# Patient Record
Sex: FEMALE | Race: WHITE | Employment: OTHER | URBAN - METROPOLITAN AREA
[De-identification: names, ages, dates, MRNs, and addresses within clinical notes are randomized per-mention and may not be internally consistent; named-entity substitution may affect disease eponyms.]

---

## 2017-02-06 ENCOUNTER — APPOINTMENT (OUTPATIENT)
Dept: CT IMAGING | Age: 82
End: 2017-02-06
Attending: EMERGENCY MEDICINE
Payer: MEDICARE

## 2017-02-06 ENCOUNTER — HOSPITAL ENCOUNTER (EMERGENCY)
Age: 82
Discharge: HOME | End: 2017-02-06
Attending: EMERGENCY MEDICINE
Payer: MEDICARE

## 2017-02-06 ENCOUNTER — HOME CARE VISIT (OUTPATIENT)
Dept: SCHEDULING | Facility: HOME HEALTH | Age: 82
End: 2017-02-06
Payer: MEDICARE

## 2017-02-06 ENCOUNTER — HOSPICE ADMISSION (OUTPATIENT)
Dept: HOSPICE | Facility: HOSPICE | Age: 82
End: 2017-02-06
Payer: MEDICARE

## 2017-02-06 VITALS
RESPIRATION RATE: 16 BRPM | SYSTOLIC BLOOD PRESSURE: 146 MMHG | TEMPERATURE: 98.2 F | WEIGHT: 116 LBS | OXYGEN SATURATION: 98 % | DIASTOLIC BLOOD PRESSURE: 62 MMHG | BODY MASS INDEX: 21.35 KG/M2 | HEIGHT: 62 IN | HEART RATE: 76 BPM

## 2017-02-06 DIAGNOSIS — I61.5 IVH (INTRAVENTRICULAR HEMORRHAGE) (HCC): Primary | ICD-10-CM

## 2017-02-06 LAB
ALBUMIN SERPL BCP-MCNC: 3.5 G/DL (ref 3.2–4.6)
ALBUMIN/GLOB SERPL: 1.1 {RATIO} (ref 1.2–3.5)
ALP SERPL-CCNC: 104 U/L (ref 50–136)
ALT SERPL-CCNC: 11 U/L (ref 12–65)
ANION GAP BLD CALC-SCNC: 10 MMOL/L (ref 7–16)
AST SERPL W P-5'-P-CCNC: 17 U/L (ref 15–37)
BASOPHILS # BLD AUTO: 0.1 K/UL (ref 0–0.2)
BASOPHILS # BLD: 0 % (ref 0–2)
BILIRUB SERPL-MCNC: 0.5 MG/DL (ref 0.2–1.1)
BUN SERPL-MCNC: 40 MG/DL (ref 8–23)
CALCIUM SERPL-MCNC: 9.4 MG/DL (ref 8.3–10.4)
CHLORIDE SERPL-SCNC: 109 MMOL/L (ref 98–107)
CO2 SERPL-SCNC: 24 MMOL/L (ref 21–32)
CREAT SERPL-MCNC: 1.29 MG/DL (ref 0.6–1)
DIFFERENTIAL METHOD BLD: ABNORMAL
EOSINOPHIL # BLD: 0.1 K/UL (ref 0–0.8)
EOSINOPHIL NFR BLD: 1 % (ref 0.5–7.8)
ERYTHROCYTE [DISTWIDTH] IN BLOOD BY AUTOMATED COUNT: 13.3 % (ref 11.9–14.6)
GLOBULIN SER CALC-MCNC: 3.2 G/DL (ref 2.3–3.5)
GLUCOSE SERPL-MCNC: 127 MG/DL (ref 65–100)
HCT VFR BLD AUTO: 34.9 % (ref 35.8–46.3)
HGB BLD-MCNC: 11.4 G/DL (ref 11.7–15.4)
IMM GRANULOCYTES # BLD: 0 K/UL (ref 0–0.5)
IMM GRANULOCYTES NFR BLD AUTO: 0.3 % (ref 0–5)
INR PPP: 1 (ref 0.9–1.2)
LYMPHOCYTES # BLD AUTO: 22 % (ref 13–44)
LYMPHOCYTES # BLD: 2.5 K/UL (ref 0.5–4.6)
MCH RBC QN AUTO: 30.4 PG (ref 26.1–32.9)
MCHC RBC AUTO-ENTMCNC: 32.7 G/DL (ref 31.4–35)
MCV RBC AUTO: 93.1 FL (ref 79.6–97.8)
MONOCYTES # BLD: 0.6 K/UL (ref 0.1–1.3)
MONOCYTES NFR BLD AUTO: 5 % (ref 4–12)
NEUTS SEG # BLD: 8.3 K/UL (ref 1.7–8.2)
NEUTS SEG NFR BLD AUTO: 72 % (ref 43–78)
PLATELET # BLD AUTO: 218 K/UL (ref 150–450)
PMV BLD AUTO: 11 FL (ref 10.8–14.1)
POTASSIUM SERPL-SCNC: 4.4 MMOL/L (ref 3.5–5.1)
PROT SERPL-MCNC: 6.7 G/DL (ref 6.3–8.2)
PROTHROMBIN TIME: 10.8 SEC (ref 9.6–12)
RBC # BLD AUTO: 3.75 M/UL (ref 4.05–5.25)
SODIUM SERPL-SCNC: 143 MMOL/L (ref 136–145)
WBC # BLD AUTO: 11.5 K/UL (ref 4.3–11.1)

## 2017-02-06 PROCEDURE — HOSPICE MEDICATION HC HH HOSPICE MEDICATION

## 2017-02-06 PROCEDURE — 99285 EMERGENCY DEPT VISIT HI MDM: CPT | Performed by: EMERGENCY MEDICINE

## 2017-02-06 PROCEDURE — 70450 CT HEAD/BRAIN W/O DYE: CPT

## 2017-02-06 PROCEDURE — 72125 CT NECK SPINE W/O DYE: CPT

## 2017-02-06 PROCEDURE — 74011250636 HC RX REV CODE- 250/636: Performed by: EMERGENCY MEDICINE

## 2017-02-06 PROCEDURE — 85610 PROTHROMBIN TIME: CPT | Performed by: EMERGENCY MEDICINE

## 2017-02-06 PROCEDURE — G0299 HHS/HOSPICE OF RN EA 15 MIN: HCPCS

## 2017-02-06 PROCEDURE — 80053 COMPREHEN METABOLIC PANEL: CPT | Performed by: EMERGENCY MEDICINE

## 2017-02-06 PROCEDURE — 96374 THER/PROPH/DIAG INJ IV PUSH: CPT | Performed by: EMERGENCY MEDICINE

## 2017-02-06 PROCEDURE — 85025 COMPLETE CBC W/AUTO DIFF WBC: CPT | Performed by: EMERGENCY MEDICINE

## 2017-02-06 PROCEDURE — 0651 HSPC ROUTINE HOME CARE

## 2017-02-06 PROCEDURE — 3336500001 HSPC ELECTION

## 2017-02-06 PROCEDURE — 96375 TX/PRO/DX INJ NEW DRUG ADDON: CPT | Performed by: EMERGENCY MEDICINE

## 2017-02-06 RX ORDER — ONDANSETRON 2 MG/ML
4 INJECTION INTRAMUSCULAR; INTRAVENOUS
Status: COMPLETED | OUTPATIENT
Start: 2017-02-06 | End: 2017-02-06

## 2017-02-06 RX ORDER — HYDROMORPHONE HYDROCHLORIDE 1 MG/ML
0.2 INJECTION, SOLUTION INTRAMUSCULAR; INTRAVENOUS; SUBCUTANEOUS
Status: COMPLETED | OUTPATIENT
Start: 2017-02-06 | End: 2017-02-06

## 2017-02-06 RX ORDER — MORPHINE SULFATE 20 MG/5ML
5 SOLUTION ORAL
Qty: 30 ML | Refills: 0 | Status: SHIPPED | OUTPATIENT
Start: 2017-02-06 | End: 2017-02-08

## 2017-02-06 RX ADMIN — ONDANSETRON 4 MG: 2 INJECTION INTRAMUSCULAR; INTRAVENOUS at 08:46

## 2017-02-06 RX ADMIN — HYDROMORPHONE HYDROCHLORIDE 0.2 MG: 1 INJECTION, SOLUTION INTRAMUSCULAR; INTRAVENOUS; SUBCUTANEOUS at 08:46

## 2017-02-06 NOTE — ED NOTES
I have reviewed discharge instructions with the caregiver. The caregiver verbalized understanding.  Pt transported back to [facility] via [address] [place] [address]

## 2017-02-06 NOTE — PROGRESS NOTES
NS   CT SHOWS A SMALL LEFT IVH  NOT TRAUMA;  CVA  89 YEARS OL  NO HYDROCEPHALUS  A/P NON SURGICAL  MED  Azeb Ortega MD

## 2017-02-06 NOTE — ED NOTES
One side rail up, bed in low position,bed brakes on, call light within reach. Pt resting in bed,resp easy,VSS,family at bedside, belongs in reach. Offered restroom and change in position.

## 2017-02-06 NOTE — DISCHARGE INSTRUCTIONS
Learning About a Hemorrhagic Stroke  What is a hemorrhagic stroke? When you have a hemorrhagic (say \"akz-ely-VG-rocío\") stroke, it means that a blood vessel in the brain has burst open or has started to leak. When the blood spills into the space inside and around the brain, it damages nearby nerve cells. This is different from an ischemic (say \"ddp-MIH-vqyi\") stroke, which happens when a blood clot blocks a blood vessel in the brain. The brain damage from a stroke starts within minutes. Quick treatment can help limit damage to the brain and make recovery more likely. People who have had a stroke may have a hard time talking, understanding things, and making decisions. They may have to relearn daily activities, such as how to eat, bathe, and dress. How well someone recovers from a stroke depends on how quickly the person gets to the hospital, where in the brain the stroke happened, and how severe it was. Stroke rehabilitation, which includes training and therapy, also helps people recover. What are the symptoms? If you have any of these symptoms, call 911 or other emergency services right away. · You have symptoms of a stroke. These may include:  ¨ Sudden numbness, tingling, weakness, or loss of movement in your face, arm, or leg, especially on only one side of your body. ¨ Sudden vision changes. ¨ Sudden trouble speaking. ¨ Sudden confusion or trouble understanding simple statements. ¨ Sudden problems with walking or balance. ¨ A sudden, severe headache that is different from past headaches. See your doctor if you have symptoms that seem like a stroke, even if they go away quickly. You may have had a transient ischemic attack (TIA), sometimes called a mini-stroke. A TIA is a warning that a stroke may happen soon. Getting early treatment for a TIA can help prevent a stroke. What causes a hemorrhagic stroke? A hemorrhagic stroke happens to blood vessels that have been weakened.  The most common causes of weakened blood vessels in the brain are:  · A brain aneurysm. This is a bulging, weak part of a blood vessel. It can put pressure on nerves, or it can bleed or break open (rupture). · A brain AVM. This is an abnormal knot of weak blood vessels that some people are born with. · Head injury. · Chronic uncontrolled high blood pressure. Blood pressure that is too high over the long term increases your risk for stroke. · Very high blood pressure. Very high blood pressure that comes on suddenly is dangerous. It is a medical emergency. Anticoagulant and antiplatelet medicines can also cause bleeding in the brain. These medicines, also called blood thinners, increase the time it takes for a blood clot to form. How is hemorrhagic stroke treated? Emergency treatment is done to stop the bleeding and prevent damage to the brain. · You may need surgery to repair an aneurysm or to remove the blood that has built up inside the brain. · You may be given medicine to stop the bleeding. · You will be closely watched for signs of increased pressure on the brain. These signs include restlessness, confusion, trouble following commands, and headache. · You may take medicine to manage high blood pressure. Ask your doctor if a stroke rehab program is right for you. Rehab increases your chances of getting back some of the abilities you lost.  Follow-up care is a key part of your treatment and safety. Be sure to make and go to all appointments, and call your doctor if you are having problems. It's also a good idea to know your test results and keep a list of the medicines you take. How can you prevent another stroke? · Work with your doctor to treat health problems, such as high blood pressure, that raise your chances of having another stroke. · Be safe with medicines. Take your medicine exactly as prescribed. Call your doctor if you think you are having a problem with your medicine.   · Have a healthy lifestyle. ¨ Do not smoke or allow others to smoke around you. If you need help quitting, talk to your doctor about stop-smoking programs and medicines. These can increase your chances of quitting for good. Smoking makes a stroke more likely. ¨ Limit alcohol to 2 drinks a day for men and 1 drink a day for women. ¨ Be active. Ask your doctor what type and level of activity is safe for you. ¨ Eat heart-healthy foods, like fruits, vegetables, and high-fiber foods. ¨ Stay at a healthy weight. Lose weight if you need to. Where can you learn more? Go to http://ray-cem.info/. Enter R416 in the search box to learn more about \"Learning About a Hemorrhagic Stroke. \"  Current as of: June 4, 2016  Content Version: 11.1  © 7191-7456 [employer]. Care instructions adapted under license by [employer] (which disclaims liability or warranty for this information). If you have questions about a medical condition or this instruction, always ask your healthcare professional. [address] any warranty or liability for your use of this information.

## 2017-02-06 NOTE — PROGRESS NOTES
Visited with patient and daughter, Mihai Dykes ([phone]). Patient is a resident of [address] [place] [address]. Daughter would like for patient to be referred to hospice and when ready for d/c go back to [address] [place] [address] with hospice. States she has nursing aides with patient over there. Call to [place]id[place] at [address] who states she will call Tanvi Moody and have her come and see patient and daughter.

## 2017-02-06 NOTE — ED PROVIDER NOTES
HPI Comments: Per nurse's notes: \"Patient arrives by EMS after falling. Patient was standing in front of her wheelchair and fell per her sitter. \"           Patient is a 80 y.o. female presenting with fall. The history is provided by the patient, the EMS personnel and a caregiver. The history is limited by the condition of the patient. Fall   The accident occurred less than 1 hour ago. The fall occurred while standing. She fell from a height of ground level. She landed on carpet. The volume of blood lost was minimal. The point of impact was the head and neck. The pain is present in the head and neck. The pain is moderate. She was not ambulatory at the scene. There was no entrapment after the fall. There was no drug use involved in the accident. There was no alcohol use involved in the accident. Associated symptoms include headaches and loss of consciousness (momentary per caregiver). Pertinent negatives include no visual change, no fever, no nausea, no vomiting and no laceration. The risk factors include dementia and being elderly. The symptoms are aggravated by pressure on injury. Treatment on scene includes a c-collar. She has tried rest and immobilization for the symptoms. The treatment provided no relief. It is unknown when the patient last had a tetanus shot.         Past Medical History:   Diagnosis Date    Altered mental status     Atrial fibrillation ([facility] [address] 8/4/2015    Cerebral thrombosis with cerebral infarction [facility])     Chronic renal insufficiency, stage II (mild) 8/4/2015    Controlled diabetes mellitus ([facility] [address] 8/4/2015    Essential hypertension, benign 5/30/2011    Gastroesophageal reflux 8/4/2015    History of malignant melanoma of eye     Hypothyroidism     Osteoarthritis, chronic 8/4/2015    Osteopenia 8/4/2015       Past Surgical History:   Procedure Laterality Date    Hx gyn       hysterectomy    Hx heent Left 1997     melanoma dx in left eye    Hx breast lumpectomy       local excision  Hx cataract removal Bilateral 1/07    Hx orthopaedic       hip fracture 10/15         Family History:   Problem Relation Age of Onset    Hypertension Mother     Cancer Father      liver ca    Cancer Brother      prostate ca       Social History     Social History    Marital status:      Spouse name: N/A    Number of children: N/A    Years of education: N/A     Occupational History    Not on file. Social History Main Topics    Smoking status: Never Smoker    Smokeless tobacco: Never Used    Alcohol use No    Drug use: No    Sexual activity: Not on file     Other Topics Concern    Not on file     Social History Narrative         ALLERGIES: Review of patient's allergies indicates no known allergies. Review of Systems   Unable to perform ROS: Dementia   Constitutional: Negative for chills and fever. Gastrointestinal: Negative for nausea and vomiting. Neurological: Positive for loss of consciousness (momentary per caregiver) and headaches. Vitals:    02/06/17 0805   BP: (!) 233/100   Pulse: 66   Resp: 18   Temp: 98.2 °F (36.8 °C)   SpO2: 97%   Weight: 52.6 kg (116 lb)   Height: 5' 2\" (1.575 m)            Physical Exam   Constitutional: She appears well-developed and well-nourished. She appears distressed. Patient continually moaning in the room, but when distracted with questions answers most appropriately and the moaning stops. HENT:   Head: Normocephalic and atraumatic. Right Ear: Tympanic membrane and external ear normal.   Left Ear: Tympanic membrane and external ear normal.   Mouth/Throat: Oropharynx is clear and moist.   Eyes: Conjunctivae and EOM are normal. Pupils are equal, round, and reactive to light. Neck: Trachea normal and phonation normal. Neck supple. Spinous process tenderness and muscular tenderness present. Carotid bruit is not present. No tracheal deviation present.    Cardiovascular: Normal rate, regular rhythm, normal heart sounds and intact distal pulses. Exam reveals no gallop and no friction rub. No murmur heard. Pulmonary/Chest: Effort normal and breath sounds normal. No respiratory distress. She has no wheezes. Abdominal: Soft. Bowel sounds are normal. She exhibits no distension and no mass. There is no hepatosplenomegaly. There is no tenderness. There is no rebound and no guarding. Musculoskeletal: Normal range of motion. She exhibits no edema. Lymphadenopathy:     She has no cervical adenopathy. Neurological: She is alert. She has normal strength. She is disoriented. She displays normal reflexes. No cranial nerve deficit or sensory deficit. GCS eye subscore is 4. GCS verbal subscore is 4. GCS motor subscore is 5. Skin: Skin is warm and dry. No laceration and no rash noted. She is not diaphoretic. No erythema. Psychiatric: Her speech is normal. Her mood appears anxious. She exhibits abnormal recent memory. Nursing note and vitals reviewed. MDM  Number of Diagnoses or Management Options  IVH (intraventricular hemorrhage) ([facility] [address]): new and requires workup     Amount and/or Complexity of Data Reviewed  Clinical lab tests: ordered and reviewed  Tests in the radiology section of CPT®: ordered and reviewed  Decide to obtain previous medical records or to obtain history from someone other than the patient: yes  Obtain history from someone other than the patient: yes  Review and summarize past medical records: yes  Discuss the patient with other providers: yes    Risk of Complications, Morbidity, and/or Mortality  Presenting problems: high  Diagnostic procedures: high  Management options: high    Patient Progress  Patient progress: stable    ED Course       Procedures      8:50 AM  Notified by radiologist of an intraventricular hemorrhage on the left. As this is technically considered subarachnoid hemorrhage, it could be either posttraumatic or secondary to aneurysm.   Neurosurgery (Dr. Walter Penny) believes this is secondary to stroke and there is no surgical intervention necessary. Recommends medical treatment. This was discussed with the daughter who would prefer to take the patient back to her facility where she has around-the-clock care and possibly consult hospice care. 12:23 PM  Hospice evaluation finally completed and the patient was discharged back to her facility with hospice care and morphine when necessary. Results Reviewed:      Recent Results (from the past 24 hour(s))   CBC WITH AUTOMATED DIFF    Collection Time: 02/06/17  8:48 AM   Result Value Ref Range    WBC 11.5 (H) 4.3 - 11.1 K/uL    RBC 3.75 (L) 4.05 - 5.25 M/uL    HGB 11.4 (L) 11.7 - 15.4 g/dL    HCT 34.9 (L) 35.8 - 46.3 %    MCV 93.1 79.6 - 97.8 FL    MCH 30.4 26.1 - 32.9 PG    MCHC 32.7 31.4 - 35.0 g/dL    RDW 13.3 11.9 - 14.6 %    PLATELET 708 765 - 475 K/uL    MPV 11.0 10.8 - 14.1 FL    DF AUTOMATED      NEUTROPHILS 72 43 - 78 %    LYMPHOCYTES 22 13 - 44 %    MONOCYTES 5 4.0 - 12.0 %    EOSINOPHILS 1 0.5 - 7.8 %    BASOPHILS 0 0.0 - 2.0 %    IMMATURE GRANULOCYTES 0.3 0.0 - 5.0 %    ABS. NEUTROPHILS 8.3 (H) 1.7 - 8.2 K/UL    ABS. LYMPHOCYTES 2.5 0.5 - 4.6 K/UL    ABS. MONOCYTES 0.6 0.1 - 1.3 K/UL    ABS. EOSINOPHILS 0.1 0.0 - 0.8 K/UL    ABS. BASOPHILS 0.1 0.0 - 0.2 K/UL    ABS. IMM.  GRANS. 0.0 0.0 - 0.5 K/UL   PROTHROMBIN TIME + INR    Collection Time: 02/06/17  8:48 AM   Result Value Ref Range    Prothrombin time 10.8 9.6 - 12.0 sec    INR 1.0 0.9 - 1.2     METABOLIC PANEL, COMPREHENSIVE    Collection Time: 02/06/17  8:48 AM   Result Value Ref Range    Sodium 143 136 - 145 mmol/L    Potassium 4.4 3.5 - 5.1 mmol/L    Chloride 109 (H) 98 - 107 mmol/L    CO2 24 21 - 32 mmol/L    Anion gap 10 7 - 16 mmol/L    Glucose 127 (H) 65 - 100 mg/dL    BUN 40 (H) 8 - 23 MG/DL    Creatinine 1.29 (H) 0.6 - 1.0 MG/DL    GFR est AA 50 (L) >60 ml/min/1.73m2    GFR est non-AA 41 (L) >60 ml/min/1.73m2    Calcium 9.4 8.3 - 10.4 MG/DL    Bilirubin, total 0.5 0.2 - 1.1 MG/DL    ALT (SGPT) 11 (L) 12 - 65 U/L    AST (SGOT) 17 15 - 37 U/L    Alk. phosphatase 104 50 - 136 U/L    Protein, total 6.7 6.3 - 8.2 g/dL    Albumin 3.5 3.2 - 4.6 g/dL    Globulin 3.2 2.3 - 3.5 g/dL    A-G Ratio 1.1 (L) 1.2 - 3.5       CT HEAD WO CONT   Final Result   IMPRESSION:  Intraventricular hemorrhage on the left. [facility]   The critical results contained in this report were communicated to Dr. José Lowery   by myself, Dr. Marni Grant on 2/6/2017 at 0845 hours. Critical results were   communicated as outlined in Section II.C.2.a.i of the ACR Practice Guideline for   Communication of Diagnostic Imaging Findings. CT SPINE CERV WO CONT   Final Result   Impression:   1. No evidence of acute cervical spine fracture. 2. Multilevel cervical spondylosis, felt to account for the anterolistheses of   C3 and C4, unchanged. 4. Paranasal sinus disease including layering fluid within the sphenoid sinuses,   nonspecific in the setting of acute trauma. I discussed the results of all labs, procedures, radiographs, and treatments with the patient and available family. Treatment plan is agreed upon and the patient is ready for discharge. All voiced understanding of the discharge plan and medication instructions or changes as appropriate. Questions about treatment in the ED were answered. All were encouraged to return should symptoms worsen or new problems develop.

## 2017-02-06 NOTE — HOSPICE
[facility]-    Met with daughter at bedside to discuss hospice services. Discussed hospice philosophy of care, care at home, care team members and freq of visits. Also discussed the [facility] for respite and symptom management. Dale is familiar with hospice as her father had [facility] at his death. Called Dr Alecia May and he approved pt for routine hospice care. Daughter, Yuri English. Requests that pt return to her assisted living, [facility] [place], and [facility] to admit into hospice program.  Rx for roxanol to be sent with pt per request from Dr Alecia May for prn severe headache or pain. Signed consents and DNR with dale.     Thank you-  Aurelia Sky RN  [address] liaison  [phone]

## 2017-02-06 NOTE — ED TRIAGE NOTES
Patient arrives by EMS after falling. Patient was standing in front of her wheelchair and fell per her sitter.

## 2017-02-07 ENCOUNTER — PATIENT OUTREACH (OUTPATIENT)
Dept: CASE MANAGEMENT | Age: 82
End: 2017-02-07

## 2017-02-07 ENCOUNTER — HOME CARE VISIT (OUTPATIENT)
Dept: HOSPICE | Facility: HOSPICE | Age: 82
End: 2017-02-07
Payer: MEDICARE

## 2017-02-07 ENCOUNTER — HOME CARE VISIT (OUTPATIENT)
Dept: SCHEDULING | Facility: HOME HEALTH | Age: 82
End: 2017-02-07
Payer: MEDICARE

## 2017-02-07 VITALS — DIASTOLIC BLOOD PRESSURE: 64 MMHG | SYSTOLIC BLOOD PRESSURE: 149 MMHG | HEART RATE: 67 BPM | RESPIRATION RATE: 22 BRPM

## 2017-02-07 PROCEDURE — G0299 HHS/HOSPICE OF RN EA 15 MIN: HCPCS

## 2017-02-07 PROCEDURE — G0155 HHCP-SVS OF CSW,EA 15 MIN: HCPCS

## 2017-02-07 PROCEDURE — 0651 HSPC ROUTINE HOME CARE

## 2017-02-07 NOTE — PROGRESS NOTES
ED D/C 2/6/17. Patient is ineligible for JASON. Patient d/c'd returned to [facility] with [facility]. Please refer to discharge notes. Will close case. Yi Sneed LPN/ Care Coordinator  [address], [address] / [place], [address]  [email or web address]  This note will not be viewable in [employer].

## 2017-02-08 ENCOUNTER — HOME CARE VISIT (OUTPATIENT)
Dept: HOSPICE | Facility: HOSPICE | Age: 82
End: 2017-02-08
Payer: MEDICARE

## 2017-02-08 PROCEDURE — 0651 HSPC ROUTINE HOME CARE

## 2017-02-08 PROCEDURE — G0299 HHS/HOSPICE OF RN EA 15 MIN: HCPCS

## 2017-02-09 VITALS
HEART RATE: 52 BPM | TEMPERATURE: 98 F | RESPIRATION RATE: 18 BRPM | SYSTOLIC BLOOD PRESSURE: 164 MMHG | DIASTOLIC BLOOD PRESSURE: 70 MMHG

## 2017-02-09 PROCEDURE — 0651 HSPC ROUTINE HOME CARE

## 2017-02-10 PROCEDURE — 0651 HSPC ROUTINE HOME CARE

## 2017-02-11 PROCEDURE — 0651 HSPC ROUTINE HOME CARE

## 2017-02-12 PROCEDURE — 0651 HSPC ROUTINE HOME CARE

## 2017-02-13 ENCOUNTER — HOME CARE VISIT (OUTPATIENT)
Dept: SCHEDULING | Facility: HOME HEALTH | Age: 82
End: 2017-02-13
Payer: MEDICARE

## 2017-02-13 PROCEDURE — G0156 HHCP-SVS OF AIDE,EA 15 MIN: HCPCS

## 2017-02-13 PROCEDURE — 0651 HSPC ROUTINE HOME CARE

## 2017-02-14 ENCOUNTER — HOME CARE VISIT (OUTPATIENT)
Dept: SCHEDULING | Facility: HOME HEALTH | Age: 82
End: 2017-02-14
Payer: MEDICARE

## 2017-02-14 PROCEDURE — HOSPICE MEDICATION HC HH HOSPICE MEDICATION

## 2017-02-14 PROCEDURE — G0156 HHCP-SVS OF AIDE,EA 15 MIN: HCPCS

## 2017-02-14 PROCEDURE — G0299 HHS/HOSPICE OF RN EA 15 MIN: HCPCS

## 2017-02-14 PROCEDURE — 0651 HSPC ROUTINE HOME CARE

## 2017-02-14 PROCEDURE — 99343 PR HOME VST NEW PATIENT MOD-HI SEVERITY 45 MINUTES: CPT | Performed by: INTERNAL MEDICINE

## 2017-02-15 PROCEDURE — 0651 HSPC ROUTINE HOME CARE

## 2017-02-16 PROCEDURE — 0651 HSPC ROUTINE HOME CARE

## 2017-02-17 VITALS
HEART RATE: 60 BPM | RESPIRATION RATE: 22 BRPM | SYSTOLIC BLOOD PRESSURE: 150 MMHG | TEMPERATURE: 98.4 F | DIASTOLIC BLOOD PRESSURE: 78 MMHG | OXYGEN SATURATION: 96 %

## 2017-02-17 PROCEDURE — 0651 HSPC ROUTINE HOME CARE

## 2017-02-18 PROCEDURE — 0651 HSPC ROUTINE HOME CARE

## 2017-02-19 PROCEDURE — 0651 HSPC ROUTINE HOME CARE

## 2017-02-20 ENCOUNTER — HOME CARE VISIT (OUTPATIENT)
Dept: SCHEDULING | Facility: HOME HEALTH | Age: 82
End: 2017-02-20
Payer: MEDICARE

## 2017-02-20 PROCEDURE — G0299 HHS/HOSPICE OF RN EA 15 MIN: HCPCS

## 2017-02-20 PROCEDURE — G0156 HHCP-SVS OF AIDE,EA 15 MIN: HCPCS

## 2017-02-20 PROCEDURE — 0651 HSPC ROUTINE HOME CARE

## 2017-02-21 ENCOUNTER — HOME CARE VISIT (OUTPATIENT)
Dept: HOSPICE | Facility: HOSPICE | Age: 82
End: 2017-02-21
Payer: MEDICARE

## 2017-02-21 ENCOUNTER — HOME CARE VISIT (OUTPATIENT)
Dept: SCHEDULING | Facility: HOME HEALTH | Age: 82
End: 2017-02-21
Payer: MEDICARE

## 2017-02-21 VITALS
HEART RATE: 60 BPM | RESPIRATION RATE: 20 BRPM | SYSTOLIC BLOOD PRESSURE: 116 MMHG | DIASTOLIC BLOOD PRESSURE: 60 MMHG | TEMPERATURE: 98.7 F

## 2017-02-21 PROCEDURE — G0156 HHCP-SVS OF AIDE,EA 15 MIN: HCPCS

## 2017-02-21 PROCEDURE — 0651 HSPC ROUTINE HOME CARE

## 2017-02-21 PROCEDURE — G0299 HHS/HOSPICE OF RN EA 15 MIN: HCPCS

## 2017-02-22 PROCEDURE — 0651 HSPC ROUTINE HOME CARE

## 2017-02-23 PROCEDURE — 0651 HSPC ROUTINE HOME CARE

## 2017-02-24 PROCEDURE — 0651 HSPC ROUTINE HOME CARE

## 2017-02-25 PROCEDURE — 0651 HSPC ROUTINE HOME CARE

## 2017-02-26 PROCEDURE — 0651 HSPC ROUTINE HOME CARE

## 2017-02-27 ENCOUNTER — HOME CARE VISIT (OUTPATIENT)
Dept: SCHEDULING | Facility: HOME HEALTH | Age: 82
End: 2017-02-27
Payer: MEDICARE

## 2017-02-27 PROCEDURE — 0651 HSPC ROUTINE HOME CARE

## 2017-02-27 PROCEDURE — HOSPICE MEDICATION HC HH HOSPICE MEDICATION

## 2017-02-27 PROCEDURE — G0156 HHCP-SVS OF AIDE,EA 15 MIN: HCPCS

## 2017-02-28 ENCOUNTER — HOME CARE VISIT (OUTPATIENT)
Dept: SCHEDULING | Facility: HOME HEALTH | Age: 82
End: 2017-02-28
Payer: MEDICARE

## 2017-02-28 ENCOUNTER — HOME CARE VISIT (OUTPATIENT)
Dept: HOSPICE | Facility: HOSPICE | Age: 82
End: 2017-02-28
Payer: MEDICARE

## 2017-02-28 PROCEDURE — G0156 HHCP-SVS OF AIDE,EA 15 MIN: HCPCS

## 2017-02-28 PROCEDURE — 0651 HSPC ROUTINE HOME CARE

## 2017-03-01 ENCOUNTER — HOME CARE VISIT (OUTPATIENT)
Dept: SCHEDULING | Facility: HOME HEALTH | Age: 82
End: 2017-03-01
Payer: MEDICARE

## 2017-03-01 PROCEDURE — G0299 HHS/HOSPICE OF RN EA 15 MIN: HCPCS

## 2017-03-01 PROCEDURE — 0651 HSPC ROUTINE HOME CARE

## 2017-03-02 VITALS — HEART RATE: 66 BPM | SYSTOLIC BLOOD PRESSURE: 166 MMHG | RESPIRATION RATE: 20 BRPM | DIASTOLIC BLOOD PRESSURE: 80 MMHG

## 2017-03-02 PROCEDURE — 0651 HSPC ROUTINE HOME CARE

## 2017-03-03 PROCEDURE — 0651 HSPC ROUTINE HOME CARE

## 2017-03-04 PROCEDURE — 0651 HSPC ROUTINE HOME CARE

## 2017-03-05 PROCEDURE — 0651 HSPC ROUTINE HOME CARE

## 2017-03-06 ENCOUNTER — HOME CARE VISIT (OUTPATIENT)
Dept: SCHEDULING | Facility: HOME HEALTH | Age: 82
End: 2017-03-06
Payer: MEDICARE

## 2017-03-06 PROCEDURE — 0651 HSPC ROUTINE HOME CARE

## 2017-03-06 PROCEDURE — G0156 HHCP-SVS OF AIDE,EA 15 MIN: HCPCS

## 2017-03-07 ENCOUNTER — HOME CARE VISIT (OUTPATIENT)
Dept: SCHEDULING | Facility: HOME HEALTH | Age: 82
End: 2017-03-07
Payer: MEDICARE

## 2017-03-07 PROCEDURE — 0651 HSPC ROUTINE HOME CARE

## 2017-03-07 PROCEDURE — G0300 HHS/HOSPICE OF LPN EA 15 MIN: HCPCS

## 2017-03-07 PROCEDURE — G0156 HHCP-SVS OF AIDE,EA 15 MIN: HCPCS

## 2017-03-08 PROCEDURE — 0651 HSPC ROUTINE HOME CARE

## 2017-03-09 ENCOUNTER — HOME CARE VISIT (OUTPATIENT)
Dept: HOSPICE | Facility: HOSPICE | Age: 82
End: 2017-03-09
Payer: MEDICARE

## 2017-03-09 VITALS — DIASTOLIC BLOOD PRESSURE: 50 MMHG | SYSTOLIC BLOOD PRESSURE: 98 MMHG | HEART RATE: 54 BPM | RESPIRATION RATE: 18 BRPM

## 2017-03-09 PROCEDURE — 0651 HSPC ROUTINE HOME CARE

## 2017-03-09 PROCEDURE — G0299 HHS/HOSPICE OF RN EA 15 MIN: HCPCS

## 2017-03-10 PROCEDURE — HOSPICE MEDICATION HC HH HOSPICE MEDICATION

## 2017-03-10 PROCEDURE — 0651 HSPC ROUTINE HOME CARE

## 2017-03-11 PROCEDURE — 0651 HSPC ROUTINE HOME CARE

## 2017-03-12 PROCEDURE — 0651 HSPC ROUTINE HOME CARE

## 2017-03-13 ENCOUNTER — HOME CARE VISIT (OUTPATIENT)
Dept: SCHEDULING | Facility: HOME HEALTH | Age: 82
End: 2017-03-13
Payer: MEDICARE

## 2017-03-13 PROCEDURE — G0299 HHS/HOSPICE OF RN EA 15 MIN: HCPCS

## 2017-03-13 PROCEDURE — G0156 HHCP-SVS OF AIDE,EA 15 MIN: HCPCS

## 2017-03-13 PROCEDURE — 0651 HSPC ROUTINE HOME CARE

## 2017-03-14 ENCOUNTER — HOME CARE VISIT (OUTPATIENT)
Dept: SCHEDULING | Facility: HOME HEALTH | Age: 82
End: 2017-03-14
Payer: MEDICARE

## 2017-03-14 PROCEDURE — 0651 HSPC ROUTINE HOME CARE

## 2017-03-14 PROCEDURE — G0156 HHCP-SVS OF AIDE,EA 15 MIN: HCPCS

## 2017-03-15 ENCOUNTER — HOME CARE VISIT (OUTPATIENT)
Dept: HOSPICE | Facility: HOSPICE | Age: 82
End: 2017-03-15
Payer: MEDICARE

## 2017-03-15 PROCEDURE — G0155 HHCP-SVS OF CSW,EA 15 MIN: HCPCS

## 2017-03-15 PROCEDURE — 0651 HSPC ROUTINE HOME CARE

## 2017-03-16 PROCEDURE — 0651 HSPC ROUTINE HOME CARE

## 2017-03-17 PROCEDURE — T4541 LARGE DISPOSABLE UNDERPAD: HCPCS

## 2017-03-17 PROCEDURE — T4522 ADULT SIZE BRIEF/DIAPER MED: HCPCS

## 2017-03-17 PROCEDURE — 0651 HSPC ROUTINE HOME CARE

## 2017-03-18 PROCEDURE — 0651 HSPC ROUTINE HOME CARE

## 2017-03-19 PROCEDURE — 0651 HSPC ROUTINE HOME CARE

## 2017-03-20 ENCOUNTER — HOME CARE VISIT (OUTPATIENT)
Dept: SCHEDULING | Facility: HOME HEALTH | Age: 82
End: 2017-03-20
Payer: MEDICARE

## 2017-03-20 PROCEDURE — G0299 HHS/HOSPICE OF RN EA 15 MIN: HCPCS

## 2017-03-20 PROCEDURE — 0651 HSPC ROUTINE HOME CARE

## 2017-03-20 PROCEDURE — G0156 HHCP-SVS OF AIDE,EA 15 MIN: HCPCS

## 2017-03-21 ENCOUNTER — HOME CARE VISIT (OUTPATIENT)
Dept: SCHEDULING | Facility: HOME HEALTH | Age: 82
End: 2017-03-21
Payer: MEDICARE

## 2017-03-21 VITALS
SYSTOLIC BLOOD PRESSURE: 120 MMHG | HEART RATE: 68 BPM | TEMPERATURE: 98.2 F | RESPIRATION RATE: 20 BRPM | DIASTOLIC BLOOD PRESSURE: 70 MMHG

## 2017-03-21 PROCEDURE — G0156 HHCP-SVS OF AIDE,EA 15 MIN: HCPCS

## 2017-03-21 PROCEDURE — 0651 HSPC ROUTINE HOME CARE

## 2017-03-22 ENCOUNTER — HOME CARE VISIT (OUTPATIENT)
Dept: HOSPICE | Facility: HOSPICE | Age: 82
End: 2017-03-22
Payer: MEDICARE

## 2017-03-22 PROCEDURE — G0299 HHS/HOSPICE OF RN EA 15 MIN: HCPCS

## 2017-03-22 PROCEDURE — HOSPICE MEDICATION HC HH HOSPICE MEDICATION

## 2017-03-22 PROCEDURE — 0651 HSPC ROUTINE HOME CARE

## 2017-03-23 PROCEDURE — 0651 HSPC ROUTINE HOME CARE

## 2017-03-24 PROCEDURE — 0651 HSPC ROUTINE HOME CARE

## 2017-03-25 PROCEDURE — 0651 HSPC ROUTINE HOME CARE

## 2017-03-26 PROCEDURE — 0651 HSPC ROUTINE HOME CARE

## 2017-03-27 ENCOUNTER — HOME CARE VISIT (OUTPATIENT)
Dept: SCHEDULING | Facility: HOME HEALTH | Age: 82
End: 2017-03-27
Payer: MEDICARE

## 2017-03-27 VITALS
SYSTOLIC BLOOD PRESSURE: 120 MMHG | TEMPERATURE: 98.7 F | DIASTOLIC BLOOD PRESSURE: 60 MMHG | HEART RATE: 60 BPM | RESPIRATION RATE: 20 BRPM

## 2017-03-27 PROCEDURE — 0651 HSPC ROUTINE HOME CARE

## 2017-03-27 PROCEDURE — G0156 HHCP-SVS OF AIDE,EA 15 MIN: HCPCS

## 2017-03-28 ENCOUNTER — HOME CARE VISIT (OUTPATIENT)
Dept: SCHEDULING | Facility: HOME HEALTH | Age: 82
End: 2017-03-28
Payer: MEDICARE

## 2017-03-28 PROCEDURE — G0156 HHCP-SVS OF AIDE,EA 15 MIN: HCPCS

## 2017-03-28 PROCEDURE — 0651 HSPC ROUTINE HOME CARE

## 2017-03-28 PROCEDURE — G0299 HHS/HOSPICE OF RN EA 15 MIN: HCPCS

## 2017-03-29 PROCEDURE — HOSPICE MEDICATION HC HH HOSPICE MEDICATION

## 2017-03-29 PROCEDURE — 0651 HSPC ROUTINE HOME CARE

## 2017-03-30 VITALS — HEART RATE: 68 BPM | SYSTOLIC BLOOD PRESSURE: 100 MMHG | DIASTOLIC BLOOD PRESSURE: 50 MMHG | RESPIRATION RATE: 20 BRPM

## 2017-03-30 PROCEDURE — 0651 HSPC ROUTINE HOME CARE

## 2017-03-31 ENCOUNTER — HOME CARE VISIT (OUTPATIENT)
Dept: HOSPICE | Facility: HOSPICE | Age: 82
End: 2017-03-31
Payer: MEDICARE

## 2017-03-31 PROCEDURE — 0651 HSPC ROUTINE HOME CARE

## 2017-03-31 PROCEDURE — G0299 HHS/HOSPICE OF RN EA 15 MIN: HCPCS

## 2017-04-01 PROCEDURE — 0651 HSPC ROUTINE HOME CARE

## 2017-04-02 PROCEDURE — 0651 HSPC ROUTINE HOME CARE

## 2017-04-03 ENCOUNTER — HOME CARE VISIT (OUTPATIENT)
Dept: SCHEDULING | Facility: HOME HEALTH | Age: 82
End: 2017-04-03
Payer: MEDICARE

## 2017-04-03 PROCEDURE — G0299 HHS/HOSPICE OF RN EA 15 MIN: HCPCS

## 2017-04-03 PROCEDURE — 0651 HSPC ROUTINE HOME CARE

## 2017-04-03 PROCEDURE — G0156 HHCP-SVS OF AIDE,EA 15 MIN: HCPCS

## 2017-04-04 ENCOUNTER — HOME CARE VISIT (OUTPATIENT)
Dept: SCHEDULING | Facility: HOME HEALTH | Age: 82
End: 2017-04-04
Payer: MEDICARE

## 2017-04-04 VITALS — RESPIRATION RATE: 20 BRPM | DIASTOLIC BLOOD PRESSURE: 70 MMHG | HEART RATE: 64 BPM | SYSTOLIC BLOOD PRESSURE: 130 MMHG

## 2017-04-04 PROCEDURE — G0156 HHCP-SVS OF AIDE,EA 15 MIN: HCPCS

## 2017-04-04 PROCEDURE — 0651 HSPC ROUTINE HOME CARE

## 2017-04-05 PROCEDURE — 0651 HSPC ROUTINE HOME CARE

## 2017-04-06 ENCOUNTER — HOME CARE VISIT (OUTPATIENT)
Dept: HOSPICE | Facility: HOSPICE | Age: 82
End: 2017-04-06
Payer: MEDICARE

## 2017-04-06 PROCEDURE — 0651 HSPC ROUTINE HOME CARE

## 2017-04-07 PROCEDURE — 0651 HSPC ROUTINE HOME CARE

## 2017-04-07 PROCEDURE — HOSPICE MEDICATION HC HH HOSPICE MEDICATION

## 2017-04-08 PROCEDURE — 0651 HSPC ROUTINE HOME CARE

## 2017-04-09 PROCEDURE — 0651 HSPC ROUTINE HOME CARE

## 2017-04-10 ENCOUNTER — HOME CARE VISIT (OUTPATIENT)
Dept: SCHEDULING | Facility: HOME HEALTH | Age: 82
End: 2017-04-10
Payer: MEDICARE

## 2017-04-10 PROCEDURE — 0651 HSPC ROUTINE HOME CARE

## 2017-04-10 PROCEDURE — G0156 HHCP-SVS OF AIDE,EA 15 MIN: HCPCS

## 2017-04-11 ENCOUNTER — HOME CARE VISIT (OUTPATIENT)
Dept: SCHEDULING | Facility: HOME HEALTH | Age: 82
End: 2017-04-11
Payer: MEDICARE

## 2017-04-11 PROCEDURE — 0651 HSPC ROUTINE HOME CARE

## 2017-04-11 PROCEDURE — G0156 HHCP-SVS OF AIDE,EA 15 MIN: HCPCS

## 2017-04-11 PROCEDURE — G0299 HHS/HOSPICE OF RN EA 15 MIN: HCPCS

## 2017-04-12 PROCEDURE — 0651 HSPC ROUTINE HOME CARE

## 2017-04-13 ENCOUNTER — HOME CARE VISIT (OUTPATIENT)
Dept: HOSPICE | Facility: HOSPICE | Age: 82
End: 2017-04-13
Payer: MEDICARE

## 2017-04-13 PROCEDURE — 0651 HSPC ROUTINE HOME CARE

## 2017-04-13 PROCEDURE — G0155 HHCP-SVS OF CSW,EA 15 MIN: HCPCS

## 2017-04-14 PROCEDURE — 0651 HSPC ROUTINE HOME CARE

## 2017-04-15 PROCEDURE — 0651 HSPC ROUTINE HOME CARE

## 2017-04-16 PROCEDURE — 0651 HSPC ROUTINE HOME CARE

## 2017-04-17 ENCOUNTER — HOME CARE VISIT (OUTPATIENT)
Dept: SCHEDULING | Facility: HOME HEALTH | Age: 82
End: 2017-04-17
Payer: MEDICARE

## 2017-04-17 VITALS
SYSTOLIC BLOOD PRESSURE: 110 MMHG | DIASTOLIC BLOOD PRESSURE: 60 MMHG | TEMPERATURE: 98.2 F | RESPIRATION RATE: 18 BRPM | HEART RATE: 60 BPM

## 2017-04-17 PROCEDURE — 0651 HSPC ROUTINE HOME CARE

## 2017-04-17 PROCEDURE — G0156 HHCP-SVS OF AIDE,EA 15 MIN: HCPCS

## 2017-04-18 ENCOUNTER — HOME CARE VISIT (OUTPATIENT)
Dept: SCHEDULING | Facility: HOME HEALTH | Age: 82
End: 2017-04-18
Payer: MEDICARE

## 2017-04-18 PROCEDURE — 0651 HSPC ROUTINE HOME CARE

## 2017-04-18 PROCEDURE — G0156 HHCP-SVS OF AIDE,EA 15 MIN: HCPCS

## 2017-04-19 PROCEDURE — 0651 HSPC ROUTINE HOME CARE

## 2017-04-20 ENCOUNTER — HOME CARE VISIT (OUTPATIENT)
Dept: HOSPICE | Facility: HOSPICE | Age: 82
End: 2017-04-20
Payer: MEDICARE

## 2017-04-20 PROCEDURE — HOSPICE MEDICATION HC HH HOSPICE MEDICATION

## 2017-04-20 PROCEDURE — 0651 HSPC ROUTINE HOME CARE

## 2017-04-20 PROCEDURE — G0299 HHS/HOSPICE OF RN EA 15 MIN: HCPCS

## 2017-04-21 VITALS — HEART RATE: 60 BPM | RESPIRATION RATE: 20 BRPM | SYSTOLIC BLOOD PRESSURE: 158 MMHG | DIASTOLIC BLOOD PRESSURE: 80 MMHG

## 2017-04-21 PROCEDURE — 0651 HSPC ROUTINE HOME CARE

## 2017-04-22 PROCEDURE — 0651 HSPC ROUTINE HOME CARE

## 2017-04-23 PROCEDURE — 0651 HSPC ROUTINE HOME CARE

## 2017-04-24 ENCOUNTER — HOME CARE VISIT (OUTPATIENT)
Dept: SCHEDULING | Facility: HOME HEALTH | Age: 82
End: 2017-04-24
Payer: MEDICARE

## 2017-04-24 PROCEDURE — G0156 HHCP-SVS OF AIDE,EA 15 MIN: HCPCS

## 2017-04-24 PROCEDURE — 0651 HSPC ROUTINE HOME CARE

## 2017-04-25 ENCOUNTER — HOME CARE VISIT (OUTPATIENT)
Dept: SCHEDULING | Facility: HOME HEALTH | Age: 82
End: 2017-04-25
Payer: MEDICARE

## 2017-04-25 VITALS
TEMPERATURE: 98.6 F | HEART RATE: 70 BPM | DIASTOLIC BLOOD PRESSURE: 60 MMHG | RESPIRATION RATE: 20 BRPM | SYSTOLIC BLOOD PRESSURE: 142 MMHG

## 2017-04-25 PROCEDURE — 0651 HSPC ROUTINE HOME CARE

## 2017-04-25 PROCEDURE — G0299 HHS/HOSPICE OF RN EA 15 MIN: HCPCS

## 2017-04-25 PROCEDURE — G0156 HHCP-SVS OF AIDE,EA 15 MIN: HCPCS

## 2017-04-26 ENCOUNTER — HOME CARE VISIT (OUTPATIENT)
Dept: HOSPICE | Facility: HOSPICE | Age: 82
End: 2017-04-26
Payer: MEDICARE

## 2017-04-26 PROCEDURE — 0651 HSPC ROUTINE HOME CARE

## 2017-04-27 PROCEDURE — 0651 HSPC ROUTINE HOME CARE

## 2017-04-28 PROCEDURE — HOSPICE MEDICATION HC HH HOSPICE MEDICATION

## 2017-04-28 PROCEDURE — 0651 HSPC ROUTINE HOME CARE

## 2017-04-29 PROCEDURE — 0651 HSPC ROUTINE HOME CARE

## 2017-04-30 PROCEDURE — 0651 HSPC ROUTINE HOME CARE

## 2017-05-01 ENCOUNTER — HOME CARE VISIT (OUTPATIENT)
Dept: SCHEDULING | Facility: HOME HEALTH | Age: 82
End: 2017-05-01
Payer: MEDICARE

## 2017-05-01 PROCEDURE — A9270 NON-COVERED ITEM OR SERVICE: HCPCS

## 2017-05-01 PROCEDURE — T4541 LARGE DISPOSABLE UNDERPAD: HCPCS

## 2017-05-01 PROCEDURE — A6250 SKIN SEAL PROTECT MOISTURIZR: HCPCS

## 2017-05-01 PROCEDURE — G0156 HHCP-SVS OF AIDE,EA 15 MIN: HCPCS

## 2017-05-01 PROCEDURE — 0651 HSPC ROUTINE HOME CARE

## 2017-05-02 ENCOUNTER — HOME CARE VISIT (OUTPATIENT)
Dept: SCHEDULING | Facility: HOME HEALTH | Age: 82
End: 2017-05-02
Payer: MEDICARE

## 2017-05-02 PROCEDURE — G0299 HHS/HOSPICE OF RN EA 15 MIN: HCPCS

## 2017-05-02 PROCEDURE — 0651 HSPC ROUTINE HOME CARE

## 2017-05-02 PROCEDURE — G0156 HHCP-SVS OF AIDE,EA 15 MIN: HCPCS

## 2017-05-03 VITALS
SYSTOLIC BLOOD PRESSURE: 126 MMHG | RESPIRATION RATE: 20 BRPM | DIASTOLIC BLOOD PRESSURE: 60 MMHG | TEMPERATURE: 98.3 F | HEART RATE: 57 BPM

## 2017-05-03 PROCEDURE — 0651 HSPC ROUTINE HOME CARE

## 2017-05-04 PROCEDURE — 0651 HSPC ROUTINE HOME CARE

## 2017-05-05 PROCEDURE — 0651 HSPC ROUTINE HOME CARE

## 2017-05-06 PROCEDURE — 0651 HSPC ROUTINE HOME CARE

## 2017-05-07 PROCEDURE — 0651 HSPC ROUTINE HOME CARE

## 2017-05-08 ENCOUNTER — HOME CARE VISIT (OUTPATIENT)
Dept: SCHEDULING | Facility: HOME HEALTH | Age: 82
End: 2017-05-08
Payer: MEDICARE

## 2017-05-08 PROCEDURE — 0651 HSPC ROUTINE HOME CARE

## 2017-05-08 PROCEDURE — G0299 HHS/HOSPICE OF RN EA 15 MIN: HCPCS

## 2017-05-09 PROCEDURE — 0651 HSPC ROUTINE HOME CARE

## 2017-05-09 PROCEDURE — HOSPICE MEDICATION HC HH HOSPICE MEDICATION

## 2017-05-10 PROCEDURE — 0651 HSPC ROUTINE HOME CARE

## 2017-05-11 ENCOUNTER — HOME CARE VISIT (OUTPATIENT)
Dept: HOSPICE | Facility: HOSPICE | Age: 82
End: 2017-05-11
Payer: MEDICARE

## 2017-05-11 PROCEDURE — 0651 HSPC ROUTINE HOME CARE

## 2017-05-11 PROCEDURE — G0155 HHCP-SVS OF CSW,EA 15 MIN: HCPCS

## 2017-05-12 PROCEDURE — 0651 HSPC ROUTINE HOME CARE

## 2017-05-13 PROCEDURE — 0651 HSPC ROUTINE HOME CARE

## 2017-05-14 PROCEDURE — 0651 HSPC ROUTINE HOME CARE

## 2017-05-15 ENCOUNTER — HOME CARE VISIT (OUTPATIENT)
Dept: SCHEDULING | Facility: HOME HEALTH | Age: 82
End: 2017-05-15
Payer: MEDICARE

## 2017-05-15 PROCEDURE — G0156 HHCP-SVS OF AIDE,EA 15 MIN: HCPCS

## 2017-05-15 PROCEDURE — 0651 HSPC ROUTINE HOME CARE

## 2017-05-16 ENCOUNTER — HOME CARE VISIT (OUTPATIENT)
Dept: HOSPICE | Facility: HOSPICE | Age: 82
End: 2017-05-16
Payer: MEDICARE

## 2017-05-16 ENCOUNTER — HOME CARE VISIT (OUTPATIENT)
Dept: SCHEDULING | Facility: HOME HEALTH | Age: 82
End: 2017-05-16
Payer: MEDICARE

## 2017-05-16 PROCEDURE — G0299 HHS/HOSPICE OF RN EA 15 MIN: HCPCS

## 2017-05-16 PROCEDURE — 0651 HSPC ROUTINE HOME CARE

## 2017-05-16 PROCEDURE — G0155 HHCP-SVS OF CSW,EA 15 MIN: HCPCS

## 2017-05-16 PROCEDURE — G0156 HHCP-SVS OF AIDE,EA 15 MIN: HCPCS

## 2017-05-17 PROCEDURE — 0651 HSPC ROUTINE HOME CARE

## 2017-05-18 VITALS
TEMPERATURE: 97.5 F | RESPIRATION RATE: 20 BRPM | HEART RATE: 58 BPM | SYSTOLIC BLOOD PRESSURE: 120 MMHG | DIASTOLIC BLOOD PRESSURE: 60 MMHG

## 2017-05-18 PROCEDURE — 0651 HSPC ROUTINE HOME CARE

## 2017-05-19 PROCEDURE — HOSPICE MEDICATION HC HH HOSPICE MEDICATION

## 2017-05-19 PROCEDURE — 0651 HSPC ROUTINE HOME CARE

## 2017-05-20 PROCEDURE — 0651 HSPC ROUTINE HOME CARE

## 2017-05-21 PROCEDURE — 0651 HSPC ROUTINE HOME CARE

## 2017-05-22 ENCOUNTER — HOME CARE VISIT (OUTPATIENT)
Dept: HOSPICE | Facility: HOSPICE | Age: 82
End: 2017-05-22
Payer: MEDICARE

## 2017-05-22 PROCEDURE — G0156 HHCP-SVS OF AIDE,EA 15 MIN: HCPCS

## 2017-05-22 PROCEDURE — 0651 HSPC ROUTINE HOME CARE

## 2017-05-23 ENCOUNTER — HOME CARE VISIT (OUTPATIENT)
Dept: SCHEDULING | Facility: HOME HEALTH | Age: 82
End: 2017-05-23
Payer: MEDICARE

## 2017-05-23 PROCEDURE — G0156 HHCP-SVS OF AIDE,EA 15 MIN: HCPCS

## 2017-05-23 PROCEDURE — 0651 HSPC ROUTINE HOME CARE

## 2017-05-24 PROCEDURE — 0651 HSPC ROUTINE HOME CARE

## 2017-05-24 PROCEDURE — HOSPICE MEDICATION HC HH HOSPICE MEDICATION

## 2017-05-25 PROCEDURE — 0651 HSPC ROUTINE HOME CARE

## 2017-05-26 ENCOUNTER — HOME CARE VISIT (OUTPATIENT)
Dept: SCHEDULING | Facility: HOME HEALTH | Age: 82
End: 2017-05-26
Payer: MEDICARE

## 2017-05-26 PROCEDURE — 0651 HSPC ROUTINE HOME CARE

## 2017-05-26 PROCEDURE — G0299 HHS/HOSPICE OF RN EA 15 MIN: HCPCS

## 2017-05-27 PROCEDURE — 0651 HSPC ROUTINE HOME CARE

## 2017-05-28 PROCEDURE — HOSPICE MEDICATION HC HH HOSPICE MEDICATION

## 2017-05-28 PROCEDURE — 0651 HSPC ROUTINE HOME CARE

## 2017-05-29 VITALS
DIASTOLIC BLOOD PRESSURE: 68 MMHG | SYSTOLIC BLOOD PRESSURE: 118 MMHG | HEART RATE: 64 BPM | RESPIRATION RATE: 18 BRPM | TEMPERATURE: 98.4 F

## 2017-05-29 PROCEDURE — 0651 HSPC ROUTINE HOME CARE

## 2017-05-30 ENCOUNTER — HOME CARE VISIT (OUTPATIENT)
Dept: SCHEDULING | Facility: HOME HEALTH | Age: 82
End: 2017-05-30
Payer: MEDICARE

## 2017-05-30 PROCEDURE — G0299 HHS/HOSPICE OF RN EA 15 MIN: HCPCS

## 2017-05-30 PROCEDURE — 0651 HSPC ROUTINE HOME CARE

## 2017-05-30 PROCEDURE — G0156 HHCP-SVS OF AIDE,EA 15 MIN: HCPCS

## 2017-05-31 PROCEDURE — 0651 HSPC ROUTINE HOME CARE

## 2017-06-01 ENCOUNTER — HOME CARE VISIT (OUTPATIENT)
Dept: HOSPICE | Facility: HOSPICE | Age: 82
End: 2017-06-01
Payer: MEDICARE

## 2017-06-01 VITALS
RESPIRATION RATE: 20 BRPM | DIASTOLIC BLOOD PRESSURE: 70 MMHG | TEMPERATURE: 97.5 F | SYSTOLIC BLOOD PRESSURE: 118 MMHG | HEART RATE: 68 BPM

## 2017-06-01 PROCEDURE — 0651 HSPC ROUTINE HOME CARE

## 2017-06-02 ENCOUNTER — HOME CARE VISIT (OUTPATIENT)
Dept: SCHEDULING | Facility: HOME HEALTH | Age: 82
End: 2017-06-02
Payer: MEDICARE

## 2017-06-02 PROCEDURE — G0155 HHCP-SVS OF CSW,EA 15 MIN: HCPCS

## 2017-06-02 PROCEDURE — 0651 HSPC ROUTINE HOME CARE

## 2017-06-03 PROCEDURE — 0651 HSPC ROUTINE HOME CARE

## 2017-06-04 PROCEDURE — 0651 HSPC ROUTINE HOME CARE

## 2017-06-05 ENCOUNTER — HOME CARE VISIT (OUTPATIENT)
Dept: SCHEDULING | Facility: HOME HEALTH | Age: 82
End: 2017-06-05
Payer: MEDICARE

## 2017-06-05 PROCEDURE — 0651 HSPC ROUTINE HOME CARE

## 2017-06-05 PROCEDURE — A6250 SKIN SEAL PROTECT MOISTURIZR: HCPCS

## 2017-06-05 PROCEDURE — T4541 LARGE DISPOSABLE UNDERPAD: HCPCS

## 2017-06-05 PROCEDURE — G0156 HHCP-SVS OF AIDE,EA 15 MIN: HCPCS

## 2017-06-05 PROCEDURE — A6260 WOUND CLEANSER ANY TYPE/SIZE: HCPCS

## 2017-06-06 ENCOUNTER — HOME CARE VISIT (OUTPATIENT)
Dept: SCHEDULING | Facility: HOME HEALTH | Age: 82
End: 2017-06-06
Payer: MEDICARE

## 2017-06-06 PROCEDURE — 0651 HSPC ROUTINE HOME CARE

## 2017-06-06 PROCEDURE — G0299 HHS/HOSPICE OF RN EA 15 MIN: HCPCS

## 2017-06-06 PROCEDURE — G0156 HHCP-SVS OF AIDE,EA 15 MIN: HCPCS

## 2017-06-07 VITALS
SYSTOLIC BLOOD PRESSURE: 128 MMHG | TEMPERATURE: 97.2 F | DIASTOLIC BLOOD PRESSURE: 72 MMHG | RESPIRATION RATE: 16 BRPM | HEART RATE: 70 BPM

## 2017-06-07 PROCEDURE — 0651 HSPC ROUTINE HOME CARE

## 2017-06-08 PROCEDURE — 0651 HSPC ROUTINE HOME CARE

## 2017-06-09 PROCEDURE — 0651 HSPC ROUTINE HOME CARE

## 2017-06-10 PROCEDURE — 0651 HSPC ROUTINE HOME CARE

## 2017-06-11 PROCEDURE — 0651 HSPC ROUTINE HOME CARE

## 2017-06-12 ENCOUNTER — HOME CARE VISIT (OUTPATIENT)
Dept: SCHEDULING | Facility: HOME HEALTH | Age: 82
End: 2017-06-12
Payer: MEDICARE

## 2017-06-12 PROCEDURE — 0651 HSPC ROUTINE HOME CARE

## 2017-06-12 PROCEDURE — G0156 HHCP-SVS OF AIDE,EA 15 MIN: HCPCS

## 2017-06-13 ENCOUNTER — HOME CARE VISIT (OUTPATIENT)
Dept: SCHEDULING | Facility: HOME HEALTH | Age: 82
End: 2017-06-13
Payer: MEDICARE

## 2017-06-13 PROCEDURE — A6250 SKIN SEAL PROTECT MOISTURIZR: HCPCS

## 2017-06-13 PROCEDURE — T4541 LARGE DISPOSABLE UNDERPAD: HCPCS

## 2017-06-13 PROCEDURE — G0156 HHCP-SVS OF AIDE,EA 15 MIN: HCPCS

## 2017-06-13 PROCEDURE — 0651 HSPC ROUTINE HOME CARE

## 2017-06-13 PROCEDURE — G0299 HHS/HOSPICE OF RN EA 15 MIN: HCPCS

## 2017-06-13 PROCEDURE — A9270 NON-COVERED ITEM OR SERVICE: HCPCS

## 2017-06-14 VITALS
HEART RATE: 70 BPM | SYSTOLIC BLOOD PRESSURE: 116 MMHG | DIASTOLIC BLOOD PRESSURE: 70 MMHG | RESPIRATION RATE: 16 BRPM | TEMPERATURE: 97 F

## 2017-06-14 PROCEDURE — 0651 HSPC ROUTINE HOME CARE

## 2017-06-15 PROCEDURE — 0651 HSPC ROUTINE HOME CARE

## 2017-06-16 PROCEDURE — 0651 HSPC ROUTINE HOME CARE

## 2017-06-17 PROCEDURE — 0651 HSPC ROUTINE HOME CARE

## 2017-06-18 PROCEDURE — 0651 HSPC ROUTINE HOME CARE

## 2017-06-19 ENCOUNTER — HOME CARE VISIT (OUTPATIENT)
Dept: SCHEDULING | Facility: HOME HEALTH | Age: 82
End: 2017-06-19
Payer: MEDICARE

## 2017-06-19 PROCEDURE — G0156 HHCP-SVS OF AIDE,EA 15 MIN: HCPCS

## 2017-06-19 PROCEDURE — 0651 HSPC ROUTINE HOME CARE

## 2017-06-20 ENCOUNTER — HOME CARE VISIT (OUTPATIENT)
Dept: HOSPICE | Facility: HOSPICE | Age: 82
End: 2017-06-20
Payer: MEDICARE

## 2017-06-20 ENCOUNTER — HOME CARE VISIT (OUTPATIENT)
Dept: SCHEDULING | Facility: HOME HEALTH | Age: 82
End: 2017-06-20
Payer: MEDICARE

## 2017-06-20 PROCEDURE — 0651 HSPC ROUTINE HOME CARE

## 2017-06-20 PROCEDURE — G0156 HHCP-SVS OF AIDE,EA 15 MIN: HCPCS

## 2017-06-21 ENCOUNTER — HOME CARE VISIT (OUTPATIENT)
Dept: SCHEDULING | Facility: HOME HEALTH | Age: 82
End: 2017-06-21
Payer: MEDICARE

## 2017-06-21 PROCEDURE — 0651 HSPC ROUTINE HOME CARE

## 2017-06-21 PROCEDURE — G0299 HHS/HOSPICE OF RN EA 15 MIN: HCPCS

## 2017-06-22 PROCEDURE — 0651 HSPC ROUTINE HOME CARE

## 2017-06-23 VITALS
RESPIRATION RATE: 18 BRPM | SYSTOLIC BLOOD PRESSURE: 112 MMHG | DIASTOLIC BLOOD PRESSURE: 60 MMHG | HEART RATE: 76 BPM | TEMPERATURE: 97 F

## 2017-06-23 PROCEDURE — 0651 HSPC ROUTINE HOME CARE

## 2017-06-24 PROCEDURE — 0651 HSPC ROUTINE HOME CARE

## 2017-06-25 PROCEDURE — 0651 HSPC ROUTINE HOME CARE

## 2017-06-26 ENCOUNTER — HOME CARE VISIT (OUTPATIENT)
Dept: SCHEDULING | Facility: HOME HEALTH | Age: 82
End: 2017-06-26
Payer: MEDICARE

## 2017-06-26 PROCEDURE — G0156 HHCP-SVS OF AIDE,EA 15 MIN: HCPCS

## 2017-06-26 PROCEDURE — T4541 LARGE DISPOSABLE UNDERPAD: HCPCS

## 2017-06-26 PROCEDURE — 0651 HSPC ROUTINE HOME CARE

## 2017-06-26 PROCEDURE — A6250 SKIN SEAL PROTECT MOISTURIZR: HCPCS

## 2017-06-27 ENCOUNTER — HOME CARE VISIT (OUTPATIENT)
Dept: SCHEDULING | Facility: HOME HEALTH | Age: 82
End: 2017-06-27
Payer: MEDICARE

## 2017-06-27 PROCEDURE — G0155 HHCP-SVS OF CSW,EA 15 MIN: HCPCS

## 2017-06-27 PROCEDURE — G0299 HHS/HOSPICE OF RN EA 15 MIN: HCPCS

## 2017-06-27 PROCEDURE — G0156 HHCP-SVS OF AIDE,EA 15 MIN: HCPCS

## 2017-06-27 PROCEDURE — 0651 HSPC ROUTINE HOME CARE

## 2017-06-28 VITALS
SYSTOLIC BLOOD PRESSURE: 120 MMHG | HEART RATE: 76 BPM | TEMPERATURE: 97 F | RESPIRATION RATE: 16 BRPM | DIASTOLIC BLOOD PRESSURE: 68 MMHG

## 2017-06-28 PROCEDURE — 0651 HSPC ROUTINE HOME CARE

## 2017-06-29 PROCEDURE — 0651 HSPC ROUTINE HOME CARE

## 2017-06-30 PROCEDURE — 0651 HSPC ROUTINE HOME CARE

## 2017-07-01 PROCEDURE — 0651 HSPC ROUTINE HOME CARE

## 2017-07-02 PROCEDURE — 0651 HSPC ROUTINE HOME CARE

## 2017-07-03 ENCOUNTER — HOME CARE VISIT (OUTPATIENT)
Dept: SCHEDULING | Facility: HOME HEALTH | Age: 82
End: 2017-07-03
Payer: MEDICARE

## 2017-07-03 PROCEDURE — G0156 HHCP-SVS OF AIDE,EA 15 MIN: HCPCS

## 2017-07-03 PROCEDURE — 0651 HSPC ROUTINE HOME CARE

## 2017-07-04 PROCEDURE — 0651 HSPC ROUTINE HOME CARE

## 2017-07-05 ENCOUNTER — HOME CARE VISIT (OUTPATIENT)
Dept: SCHEDULING | Facility: HOME HEALTH | Age: 82
End: 2017-07-05
Payer: MEDICARE

## 2017-07-05 PROCEDURE — G0299 HHS/HOSPICE OF RN EA 15 MIN: HCPCS

## 2017-07-05 PROCEDURE — 0651 HSPC ROUTINE HOME CARE

## 2017-07-06 PROCEDURE — 0651 HSPC ROUTINE HOME CARE

## 2017-07-07 ENCOUNTER — HOME CARE VISIT (OUTPATIENT)
Dept: HOSPICE | Facility: HOSPICE | Age: 82
End: 2017-07-07
Payer: MEDICARE

## 2017-07-07 PROCEDURE — 0651 HSPC ROUTINE HOME CARE

## 2017-07-08 VITALS
SYSTOLIC BLOOD PRESSURE: 124 MMHG | HEART RATE: 72 BPM | RESPIRATION RATE: 18 BRPM | TEMPERATURE: 97.2 F | DIASTOLIC BLOOD PRESSURE: 72 MMHG

## 2017-07-08 PROCEDURE — 0651 HSPC ROUTINE HOME CARE

## 2017-07-09 PROCEDURE — 0651 HSPC ROUTINE HOME CARE

## 2017-07-10 ENCOUNTER — HOME CARE VISIT (OUTPATIENT)
Dept: SCHEDULING | Facility: HOME HEALTH | Age: 82
End: 2017-07-10
Payer: MEDICARE

## 2017-07-10 PROCEDURE — 0651 HSPC ROUTINE HOME CARE

## 2017-07-10 PROCEDURE — G0156 HHCP-SVS OF AIDE,EA 15 MIN: HCPCS

## 2017-07-11 ENCOUNTER — HOME CARE VISIT (OUTPATIENT)
Dept: SCHEDULING | Facility: HOME HEALTH | Age: 82
End: 2017-07-11
Payer: MEDICARE

## 2017-07-11 ENCOUNTER — HOME CARE VISIT (OUTPATIENT)
Dept: HOSPICE | Facility: HOSPICE | Age: 82
End: 2017-07-11
Payer: MEDICARE

## 2017-07-11 PROCEDURE — G0156 HHCP-SVS OF AIDE,EA 15 MIN: HCPCS

## 2017-07-11 PROCEDURE — G0155 HHCP-SVS OF CSW,EA 15 MIN: HCPCS

## 2017-07-11 PROCEDURE — G0299 HHS/HOSPICE OF RN EA 15 MIN: HCPCS

## 2017-07-11 PROCEDURE — A6250 SKIN SEAL PROTECT MOISTURIZR: HCPCS

## 2017-07-11 PROCEDURE — 0651 HSPC ROUTINE HOME CARE

## 2017-07-11 PROCEDURE — T4541 LARGE DISPOSABLE UNDERPAD: HCPCS

## 2017-07-12 PROCEDURE — 0651 HSPC ROUTINE HOME CARE

## 2017-07-13 VITALS
RESPIRATION RATE: 20 BRPM | HEART RATE: 80 BPM | TEMPERATURE: 97.3 F | DIASTOLIC BLOOD PRESSURE: 76 MMHG | SYSTOLIC BLOOD PRESSURE: 124 MMHG

## 2017-07-13 PROCEDURE — 0651 HSPC ROUTINE HOME CARE

## 2017-07-14 PROCEDURE — 0651 HSPC ROUTINE HOME CARE

## 2017-07-15 PROCEDURE — 0651 HSPC ROUTINE HOME CARE

## 2017-07-16 PROCEDURE — 0651 HSPC ROUTINE HOME CARE

## 2017-07-17 ENCOUNTER — HOME CARE VISIT (OUTPATIENT)
Dept: SCHEDULING | Facility: HOME HEALTH | Age: 82
End: 2017-07-17
Payer: MEDICARE

## 2017-07-17 PROCEDURE — 0651 HSPC ROUTINE HOME CARE

## 2017-07-17 PROCEDURE — G0156 HHCP-SVS OF AIDE,EA 15 MIN: HCPCS

## 2017-07-18 ENCOUNTER — HOME CARE VISIT (OUTPATIENT)
Dept: SCHEDULING | Facility: HOME HEALTH | Age: 82
End: 2017-07-18
Payer: MEDICARE

## 2017-07-18 PROCEDURE — 0651 HSPC ROUTINE HOME CARE

## 2017-07-18 PROCEDURE — G0299 HHS/HOSPICE OF RN EA 15 MIN: HCPCS
